# Patient Record
Sex: FEMALE | Race: WHITE | URBAN - METROPOLITAN AREA
[De-identification: names, ages, dates, MRNs, and addresses within clinical notes are randomized per-mention and may not be internally consistent; named-entity substitution may affect disease eponyms.]

---

## 2020-06-25 ENCOUNTER — OFFICE VISIT (OUTPATIENT)
Dept: RHEUMATOLOGY | Facility: CLINIC | Age: 30
End: 2020-06-25
Payer: COMMERCIAL

## 2020-06-25 VITALS
BODY MASS INDEX: 22.76 KG/M2 | DIASTOLIC BLOOD PRESSURE: 82 MMHG | SYSTOLIC BLOOD PRESSURE: 128 MMHG | RESPIRATION RATE: 16 BRPM | WEIGHT: 145 LBS | TEMPERATURE: 98 F | HEIGHT: 67 IN | HEART RATE: 100 BPM

## 2020-06-25 DIAGNOSIS — M25.50 POLYARTHRALGIA: Primary | ICD-10-CM

## 2020-06-25 DIAGNOSIS — E55.9 VITAMIN D DEFICIENCY: ICD-10-CM

## 2020-06-25 DIAGNOSIS — R53.82 CHRONIC FATIGUE: ICD-10-CM

## 2020-06-25 DIAGNOSIS — M79.10 MYALGIA: ICD-10-CM

## 2020-06-25 DIAGNOSIS — R76.8 POSITIVE ANA (ANTINUCLEAR ANTIBODY): ICD-10-CM

## 2020-06-25 DIAGNOSIS — M35.7 BENIGN JOINT HYPERMOBILITY: ICD-10-CM

## 2020-06-25 DIAGNOSIS — Z86.59 HISTORY OF DEPRESSION: ICD-10-CM

## 2020-06-25 PROCEDURE — 99204 OFFICE O/P NEW MOD 45 MIN: CPT | Performed by: INTERNAL MEDICINE

## 2020-06-25 RX ORDER — GALCANEZUMAB 120 MG/ML
INJECTION, SOLUTION SUBCUTANEOUS
COMMUNITY
Start: 2020-05-27

## 2020-06-25 RX ORDER — VALACYCLOVIR HYDROCHLORIDE 500 MG/1
1 TABLET, FILM COATED ORAL 2 TIMES DAILY
COMMUNITY

## 2020-06-25 NOTE — PROGRESS NOTES
RHEUMATOLOGY NEW PATIENT   Date of visit: 6/25/2020  ? Patient presents with:  Establish Care: new pt. Self referred. Possitive PORSCHE previously. Butterfly rash in january. Swelling and pain in hands and feet that wont go away.  Previously saw a dr. Marietta Zhu for this visit:    Polyarthralgia  -     PORSCHE BY IFA WITH REFLEX; Future  -     PORSCHE BY IFA, IGG; Future  -     COMPLEMENT C3, SERUM; Future  -     COMPLEMENT C4, SERUM; Future  -     C-REACTIVE PROTEIN; Future  -     SED RATE, EDGAR (AUTOMATED);  Future a 31year old female with the following active problems who is referred for rheumatologic evaluation due to joint pain. States around age 18, she was having joint pain and felt swelling, although not swollen on exam. Significant fatigue.  States she did Takes duloxetine, has been on for about a year.     + morning stiffness, particularly in the hands with difficulty making a fist, lasts for about 2 hours, improved with activity  + sores in nose recently  + canker sores few times per month   + color change crohn's    Past Medical History:  Past Medical History:   Diagnosis Date   • Anxiety    • Depression    • Dysmenorrhea    • Endometriosis    • Menorrhagia      Past Surgical History:  Past Surgical History:   Procedure Laterality Date   • D & C  2014   • O frequency and urgency. Musculoskeletal: Positive for back pain and joint pain. Negative for neck pain. Skin: Positive for rash. Negative for itching. Neurological: Positive for tingling and headaches. Negative for dizziness.    Endo/Heme/Allergies: Br from mid thoracic down. Bilateral knees without medial joint line tenderness, no crepitus, no effusion. Hyperextension of elbows by 20 degrees and knees by 10 degrees   Lymphadenopathy:     She has no cervical adenopathy.    Neurological: She is alert an

## 2020-10-05 ENCOUNTER — TELEPHONE (OUTPATIENT)
Dept: RHEUMATOLOGY | Facility: CLINIC | Age: 30
End: 2020-10-05

## 2020-10-05 NOTE — TELEPHONE ENCOUNTER
Called pt to remind her of her missed 1:45 appointment. Pt didn't answer and her mailbox was full so I could not leave a message.

## 2021-08-31 ENCOUNTER — LAB ENCOUNTER (OUTPATIENT)
Dept: LAB | Age: 31
End: 2021-08-31
Attending: INTERNAL MEDICINE
Payer: COMMERCIAL

## 2021-08-31 ENCOUNTER — OFFICE VISIT (OUTPATIENT)
Dept: RHEUMATOLOGY | Facility: CLINIC | Age: 31
End: 2021-08-31
Payer: COMMERCIAL

## 2021-08-31 VITALS
DIASTOLIC BLOOD PRESSURE: 88 MMHG | HEART RATE: 80 BPM | HEIGHT: 67 IN | RESPIRATION RATE: 16 BRPM | SYSTOLIC BLOOD PRESSURE: 138 MMHG | TEMPERATURE: 98 F | BODY MASS INDEX: 21.66 KG/M2 | WEIGHT: 138 LBS

## 2021-08-31 DIAGNOSIS — M25.50 POLYARTHRALGIA: Primary | ICD-10-CM

## 2021-08-31 DIAGNOSIS — E55.9 VITAMIN D DEFICIENCY: ICD-10-CM

## 2021-08-31 DIAGNOSIS — R10.9 FLANK PAIN: ICD-10-CM

## 2021-08-31 DIAGNOSIS — R76.8 POSITIVE ANA (ANTINUCLEAR ANTIBODY): ICD-10-CM

## 2021-08-31 DIAGNOSIS — R23.1 LIVEDO RETICULARIS: ICD-10-CM

## 2021-08-31 DIAGNOSIS — M25.50 POLYARTHRALGIA: ICD-10-CM

## 2021-08-31 DIAGNOSIS — M79.10 MYALGIA: ICD-10-CM

## 2021-08-31 DIAGNOSIS — R53.82 CHRONIC FATIGUE: ICD-10-CM

## 2021-08-31 LAB
ALBUMIN SERPL-MCNC: 4.2 G/DL (ref 3.4–5)
ALBUMIN/GLOB SERPL: 1.2 {RATIO} (ref 1–2)
ALP LIVER SERPL-CCNC: 59 U/L
ALT SERPL-CCNC: 21 U/L
ANION GAP SERPL CALC-SCNC: 3 MMOL/L (ref 0–18)
AST SERPL-CCNC: 15 U/L (ref 15–37)
BASOPHILS # BLD AUTO: 0.02 X10(3) UL (ref 0–0.2)
BASOPHILS NFR BLD AUTO: 0.4 %
BILIRUB SERPL-MCNC: 0.5 MG/DL (ref 0.1–2)
BILIRUB UR QL STRIP.AUTO: NEGATIVE
BUN BLD-MCNC: 14 MG/DL (ref 7–18)
C3 SERPL-MCNC: 109 MG/DL (ref 90–180)
C4 SERPL-MCNC: 29.3 MG/DL (ref 10–40)
CALCIUM BLD-MCNC: 9.4 MG/DL (ref 8.5–10.1)
CHLORIDE SERPL-SCNC: 111 MMOL/L (ref 98–112)
CK SERPL-CCNC: 43 U/L
CLARITY UR REFRACT.AUTO: CLEAR
CO2 SERPL-SCNC: 25 MMOL/L (ref 21–32)
CREAT BLD-MCNC: 1.04 MG/DL
CREAT UR-SCNC: 40.4 MG/DL
CRP SERPL-MCNC: 1.27 MG/DL (ref ?–0.3)
EOSINOPHIL # BLD AUTO: 0.08 X10(3) UL (ref 0–0.7)
EOSINOPHIL NFR BLD AUTO: 1.6 %
ERYTHROCYTE [DISTWIDTH] IN BLOOD BY AUTOMATED COUNT: 13.3 %
GLOBULIN PLAS-MCNC: 3.5 G/DL (ref 2.8–4.4)
GLUCOSE BLD-MCNC: 96 MG/DL (ref 70–99)
GLUCOSE UR STRIP.AUTO-MCNC: NEGATIVE MG/DL
HAV IGM SER QL: 2 MG/DL (ref 1.6–2.6)
HCT VFR BLD AUTO: 38.6 %
HGB BLD-MCNC: 12.9 G/DL
IMM GRANULOCYTES # BLD AUTO: 0.01 X10(3) UL (ref 0–1)
IMM GRANULOCYTES NFR BLD: 0.2 %
KETONES UR STRIP.AUTO-MCNC: NEGATIVE MG/DL
LEUKOCYTE ESTERASE UR QL STRIP.AUTO: NEGATIVE
LYMPHOCYTES # BLD AUTO: 1.43 X10(3) UL (ref 1–4)
LYMPHOCYTES NFR BLD AUTO: 28 %
M PROTEIN MFR SERPL ELPH: 7.7 G/DL (ref 6.4–8.2)
MCH RBC QN AUTO: 30.4 PG (ref 26–34)
MCHC RBC AUTO-ENTMCNC: 33.4 G/DL (ref 31–37)
MCV RBC AUTO: 91 FL
MONOCYTES # BLD AUTO: 0.34 X10(3) UL (ref 0.1–1)
MONOCYTES NFR BLD AUTO: 6.7 %
NEUTROPHILS # BLD AUTO: 3.22 X10 (3) UL (ref 1.5–7.7)
NEUTROPHILS # BLD AUTO: 3.22 X10(3) UL (ref 1.5–7.7)
NEUTROPHILS NFR BLD AUTO: 63.1 %
NITRITE UR QL STRIP.AUTO: NEGATIVE
OSMOLALITY SERPL CALC.SUM OF ELEC: 288 MOSM/KG (ref 275–295)
PATIENT FASTING Y/N/NP: YES
PH UR STRIP.AUTO: 6 [PH] (ref 5–8)
PHOSPHATE SERPL-MCNC: 3.2 MG/DL (ref 2.5–4.9)
PLATELET # BLD AUTO: 214 10(3)UL (ref 150–450)
POTASSIUM SERPL-SCNC: 3.7 MMOL/L (ref 3.5–5.1)
PROT UR STRIP.AUTO-MCNC: NEGATIVE MG/DL
PROT UR-MCNC: 5.2 MG/DL
PROT/CREAT UR-RTO: 0.13
RBC # BLD AUTO: 4.24 X10(6)UL
RBC UR QL AUTO: NEGATIVE
RHEUMATOID FACT SERPL-ACNC: <10 IU/ML (ref ?–15)
SED RATE-ML: 9 MM/HR
SODIUM SERPL-SCNC: 139 MMOL/L (ref 136–145)
SP GR UR STRIP.AUTO: 1.01 (ref 1–1.03)
UROBILINOGEN UR STRIP.AUTO-MCNC: <2 MG/DL
VIT D+METAB SERPL-MCNC: 46.3 NG/ML (ref 30–100)
WBC # BLD AUTO: 5.1 X10(3) UL (ref 4–11)

## 2021-08-31 PROCEDURE — 84100 ASSAY OF PHOSPHORUS: CPT | Performed by: INTERNAL MEDICINE

## 2021-08-31 PROCEDURE — 99215 OFFICE O/P EST HI 40 MIN: CPT | Performed by: INTERNAL MEDICINE

## 2021-08-31 PROCEDURE — 85613 RUSSELL VIPER VENOM DILUTED: CPT | Performed by: INTERNAL MEDICINE

## 2021-08-31 PROCEDURE — 85610 PROTHROMBIN TIME: CPT | Performed by: INTERNAL MEDICINE

## 2021-08-31 PROCEDURE — 86160 COMPLEMENT ANTIGEN: CPT | Performed by: INTERNAL MEDICINE

## 2021-08-31 PROCEDURE — 86038 ANTINUCLEAR ANTIBODIES: CPT | Performed by: INTERNAL MEDICINE

## 2021-08-31 PROCEDURE — 82306 VITAMIN D 25 HYDROXY: CPT | Performed by: INTERNAL MEDICINE

## 2021-08-31 PROCEDURE — 86431 RHEUMATOID FACTOR QUANT: CPT | Performed by: INTERNAL MEDICINE

## 2021-08-31 PROCEDURE — 82570 ASSAY OF URINE CREATININE: CPT | Performed by: INTERNAL MEDICINE

## 2021-08-31 PROCEDURE — 85705 THROMBOPLASTIN INHIBITION: CPT | Performed by: INTERNAL MEDICINE

## 2021-08-31 PROCEDURE — 80053 COMPREHEN METABOLIC PANEL: CPT | Performed by: INTERNAL MEDICINE

## 2021-08-31 PROCEDURE — 86334 IMMUNOFIX E-PHORESIS SERUM: CPT | Performed by: INTERNAL MEDICINE

## 2021-08-31 PROCEDURE — 81003 URINALYSIS AUTO W/O SCOPE: CPT | Performed by: INTERNAL MEDICINE

## 2021-08-31 PROCEDURE — 86146 BETA-2 GLYCOPROTEIN ANTIBODY: CPT | Performed by: INTERNAL MEDICINE

## 2021-08-31 PROCEDURE — 3075F SYST BP GE 130 - 139MM HG: CPT | Performed by: INTERNAL MEDICINE

## 2021-08-31 PROCEDURE — 83516 IMMUNOASSAY NONANTIBODY: CPT | Performed by: INTERNAL MEDICINE

## 2021-08-31 PROCEDURE — 82550 ASSAY OF CK (CPK): CPT | Performed by: INTERNAL MEDICINE

## 2021-08-31 PROCEDURE — 84165 PROTEIN E-PHORESIS SERUM: CPT | Performed by: INTERNAL MEDICINE

## 2021-08-31 PROCEDURE — 85025 COMPLETE CBC W/AUTO DIFF WBC: CPT | Performed by: INTERNAL MEDICINE

## 2021-08-31 PROCEDURE — 83883 ASSAY NEPHELOMETRY NOT SPEC: CPT | Performed by: INTERNAL MEDICINE

## 2021-08-31 PROCEDURE — 86200 CCP ANTIBODY: CPT | Performed by: INTERNAL MEDICINE

## 2021-08-31 PROCEDURE — 86147 CARDIOLIPIN ANTIBODY EA IG: CPT | Performed by: INTERNAL MEDICINE

## 2021-08-31 PROCEDURE — 85652 RBC SED RATE AUTOMATED: CPT | Performed by: INTERNAL MEDICINE

## 2021-08-31 PROCEDURE — 3008F BODY MASS INDEX DOCD: CPT | Performed by: INTERNAL MEDICINE

## 2021-08-31 PROCEDURE — 85732 THROMBOPLASTIN TIME PARTIAL: CPT | Performed by: INTERNAL MEDICINE

## 2021-08-31 PROCEDURE — 3079F DIAST BP 80-89 MM HG: CPT | Performed by: INTERNAL MEDICINE

## 2021-08-31 PROCEDURE — 84156 ASSAY OF PROTEIN URINE: CPT | Performed by: INTERNAL MEDICINE

## 2021-08-31 PROCEDURE — 83735 ASSAY OF MAGNESIUM: CPT | Performed by: INTERNAL MEDICINE

## 2021-08-31 PROCEDURE — 86140 C-REACTIVE PROTEIN: CPT | Performed by: INTERNAL MEDICINE

## 2021-08-31 NOTE — PROGRESS NOTES
RHEUMATOLOGY FOLLOW UP   Date of visit: 08/31/2021  ? Patient presents with: Follow - Up: over one year f/u. Feeling pretty much the same as first visit. Butterfly rash comes and goes. Swelling in hands and feet. Muscle aches.  Really dry mouth started will follow up in 3-4 months or sooner as needed. Of note, pt does have some hypermobility of her elbows and knees and discussed that this could lead to early degenerative changes in these areas. Patient verbalized understanding of above instructions. CBC WITH DIFFERENTIAL WITH PLATELET; Future  -     COMP METABOLIC PANEL (14); Future  -     URINALYSIS WITH CULTURE REFLEX; Future  -     SED RATE, WESTERGREN (AUTOMATED);  Future  -     C-REACTIVE PROTEIN; Future  -     COMPLEMENT C4, SERUM; Future  - recommended at her last visit. Does still get rash over her face which she thinks is butterfly rash  + joint pain in hands and feet as well as knee. Describes as an aching worst in the hands.  Swelling located over MCPs and PIPs  + morning stiffness, last except Lyrica. Stopped all medication when she was pregnant with her daughter. States was doing well until recently. January of this year, developed redness over her cheeks. Feels much worse compared to previously.   Joint pain located in hands, feet, antihypertensives for prolonged periods, BP did not normalized immediately after delivery. No issues with BP since that time. Hx of one miscarriage first trimester and another ectopic between her 2nd and 3rd children.      No history of significant pain o Yes      Comment: socially    Drug use: Never    Medications:  NURTEC 75 MG Oral Tablet Dispersible, TAKE 1 TABLET BY MOUTH AS NEEDED (MIGRAINES), Disp: , Rfl:   DULoxetine HCl 60 MG Oral Cap DR Particles, Take 60 mg by mouth daily. , Disp: , Rfl:       Mod Right Ear: External ear normal.      Left Ear: External ear normal.   Eyes:      General: No scleral icterus. Conjunctiva/sclera: Conjunctivae normal.      Pupils: Pupils are equal, round, and reactive to light. Neck:      Vascular: No JVD. abnormality within the abdomen and pelvis.     Labs:  No results found for: WBC, RBC, HGB, HCT, PLT, MPV, MCV, MCH, MCHC, RDW, NEPRELIM, NEUTABS, LYMPHABS, EOSABS, BASABS, NEUT, LYMPH, MON, EOS, BASO, NEPERCENT, LYPERCENT, MOPERCENT, EOPERCENT, BAPERCENT, N

## 2021-09-01 LAB — ANA SCREEN: NEGATIVE

## 2021-09-02 LAB
ANTI-NUCLEAR ANTIBODY (ANA), HEP-2 IGG: DETECTED
GBM AB, IGG (MAFD): 0 AU/ML

## 2021-09-03 LAB
APTT PPP: 27.5 SECONDS (ref 25.4–36.1)
BETA 2 GLYCOPROTEIN 1 AB, IGG: <0.6 U/ML (ref ?–7)
BETA 2 GLYCOPROTEIN 1 AB, IGM: <2.9 U/ML (ref ?–7)
CARDIOLIPIN IGG SERPL-ACNC: 2.2 GPL (ref ?–10)
CARDIOLIPIN IGM SERPL-ACNC: <0.8 MPL (ref ?–10)
CCP IGG SERPL-ACNC: 1.5 U/ML (ref 0–6.9)
DRVVT LUPUS ANTICOAGULANT: NEGATIVE
DRVVT SCREEN RATIO: 0.96 (ref 0–1.29)
PT(LUPUS): 12.7 SECONDS (ref 12.2–14.5)
STACLOT LA DELTA: 2.7 SECONDS (ref ?–8)
STACLOT LA: NEGATIVE

## 2021-09-04 LAB
ALBUMIN SERPL ELPH-MCNC: 4.7 G/DL (ref 3.75–5.21)
ALBUMIN/GLOB SERPL: 1.67 {RATIO} (ref 1–2)
ALPHA1 GLOB SERPL ELPH-MCNC: 0.38 G/DL (ref 0.19–0.46)
ALPHA2 GLOB SERPL ELPH-MCNC: 0.74 G/DL (ref 0.48–1.05)
B-GLOBULIN SERPL ELPH-MCNC: 0.69 G/DL (ref 0.68–1.23)
GAMMA GLOB SERPL ELPH-MCNC: 0.99 G/DL (ref 0.62–1.7)
KAPPA LC FREE SER-MCNC: 1.52 MG/DL (ref 0.33–1.94)
KAPPA LC FREE/LAMBDA FREE SER NEPH: 1.31 {RATIO} (ref 0.26–1.65)
LAMBDA LC FREE SERPL-MCNC: 1.16 MG/DL (ref 0.57–2.63)
M PROTEIN MFR SERPL ELPH: 7.5 G/DL (ref 6.4–8.2)

## 2021-09-08 ENCOUNTER — TELEPHONE (OUTPATIENT)
Dept: RHEUMATOLOGY | Facility: CLINIC | Age: 31
End: 2021-09-08

## 2021-09-08 DIAGNOSIS — N28.9 ABNORMAL KIDNEY FUNCTION: ICD-10-CM

## 2021-09-08 DIAGNOSIS — M79.7 FIBROMYALGIA: Primary | ICD-10-CM

## 2021-09-08 NOTE — TELEPHONE ENCOUNTER
Pt returned Dr. Gem Crouch telephone call regarding lab results. Pt will be available all day today and tomorrow.

## 2021-09-08 NOTE — TELEPHONE ENCOUNTER
LVM for pt to call back to discuss results.      Niranjan Schuler, DO  [facility]  9/8/2021 08/2021  Phosphorus normal  Magnesium normal  CK 43 normal  CRP 1.27 elevated  ESR 9 normal  UA with grossly normal  CMP with creatinine 1.04 borderline ivette

## 2021-09-09 NOTE — TELEPHONE ENCOUNTER
[phone] cell  Pt returned your call re: results. She is aware Dr is unavailable until after clinic tomorrow. Pt agrees to be available 3:30-4pm at above number if possible.

## 2021-09-13 ENCOUNTER — OFFICE VISIT (OUTPATIENT)
Dept: NEPHROLOGY | Facility: CLINIC | Age: 31
End: 2021-09-13
Payer: COMMERCIAL

## 2021-09-13 VITALS — SYSTOLIC BLOOD PRESSURE: 132 MMHG | WEIGHT: 139.25 LBS | BODY MASS INDEX: 22 KG/M2 | DIASTOLIC BLOOD PRESSURE: 82 MMHG

## 2021-09-13 DIAGNOSIS — R79.89 ELEVATED SERUM CREATININE: Primary | ICD-10-CM

## 2021-09-13 PROCEDURE — 3075F SYST BP GE 130 - 139MM HG: CPT | Performed by: INTERNAL MEDICINE

## 2021-09-13 PROCEDURE — 99243 OFF/OP CNSLTJ NEW/EST LOW 30: CPT | Performed by: INTERNAL MEDICINE

## 2021-09-13 PROCEDURE — 3079F DIAST BP 80-89 MM HG: CPT | Performed by: INTERNAL MEDICINE

## 2021-09-13 RX ORDER — CYCLOBENZAPRINE HCL 10 MG
TABLET ORAL
COMMUNITY
Start: 2021-09-01

## 2021-09-13 RX ORDER — DAPAGLIFLOZIN 10 MG/1
TABLET, FILM COATED ORAL
COMMUNITY
Start: 2021-09-07

## 2021-09-13 RX ORDER — HYDROCODONE BITARTRATE AND ACETAMINOPHEN 10; 325 MG/1; MG/1
TABLET ORAL
COMMUNITY
Start: 2021-09-01

## 2021-09-13 RX ORDER — DEXTROAMPHETAMINE SACCHARATE, AMPHETAMINE ASPARTATE, DEXTROAMPHETAMINE SULFATE AND AMPHETAMINE SULFATE 3.75; 3.75; 3.75; 3.75 MG/1; MG/1; MG/1; MG/1
TABLET ORAL
COMMUNITY
Start: 2021-09-08

## 2021-09-13 NOTE — PROGRESS NOTES
Consult Note      REASON FOR CONSULT:  Elevated creatinine         HPI:   Camilo Snellen is a 36year old female with Patient presents with:  New Patient: elevated Cr    Raghu Borja was seen in the nephrology clinic today in consultation Tab TAKE 15 MG 2 (TWO) TIMES A DAY BY MOUTH MAX DAILY AMOUNT  30 MG     • Cholecalciferol 125 MCG (5000 UT) Oral Tab Vitamin D3     • cyclobenzaprine 10 MG Oral Tab TAKE 1 TABLET BY MOUTH TWICE A DY AS NEEDED FOR MUSCLE SPASMS FOR UP T0 10 DAYS     • GARY CARD JR. [facility] lb (62.6 kg)  04/14/21 : 140 lb (63.5 kg)  08/20/20 : 145 lb (65.8 kg)  08/17/20 : 140 lb (63.5 kg)  07/20/20 : 140 lb (63.5 kg)    General: Alert and oriented in no apparent distress.   HEENT: No scleral icterus, MMM  Neck: Supple, no CHELA or thyromegaly  C UROBILINOGEN <2.0 08/31/2021    NITRITE Negative 08/31/2021    LEUUR Negative 08/31/2021    NMIC Microscopic not indicated 08/31/2021          ASSESSMENT/PLAN:       #1.  Elevated creatinine-recent labs show a creatinine of 1.04 mg/dL and while this is kari

## (undated) NOTE — Clinical Note
Please send copy of note to PCP: NADIYA Kaur  [address] [place] [address] Arturo Douglas [facility]8 [phone] ([address] [place], [place] [address]/25/2020